# Patient Record
Sex: FEMALE | Race: ASIAN | NOT HISPANIC OR LATINO
[De-identification: names, ages, dates, MRNs, and addresses within clinical notes are randomized per-mention and may not be internally consistent; named-entity substitution may affect disease eponyms.]

---

## 2023-03-13 ENCOUNTER — NON-APPOINTMENT (OUTPATIENT)
Age: 53
End: 2023-03-13

## 2023-03-16 ENCOUNTER — APPOINTMENT (OUTPATIENT)
Dept: NEUROLOGY | Facility: CLINIC | Age: 53
End: 2023-03-16
Payer: COMMERCIAL

## 2023-03-16 ENCOUNTER — TRANSCRIPTION ENCOUNTER (OUTPATIENT)
Age: 53
End: 2023-03-16

## 2023-03-16 DIAGNOSIS — C79.49 SECONDARY MALIGNANT NEOPLASM OF OTHER PARTS OF NERVOUS SYSTEM: ICD-10-CM

## 2023-03-16 DIAGNOSIS — C79.31 MALIGNANT NEOPLASM OF UNSPECIFIED PART OF UNSPECIFIED BRONCHUS OR LUNG: ICD-10-CM

## 2023-03-16 DIAGNOSIS — C34.90 MALIGNANT NEOPLASM OF UNSPECIFIED PART OF UNSPECIFIED BRONCHUS OR LUNG: ICD-10-CM

## 2023-03-16 DIAGNOSIS — C80.1 SECONDARY MALIGNANT NEOPLASM OF OTHER PARTS OF NERVOUS SYSTEM: ICD-10-CM

## 2023-03-16 PROBLEM — Z00.00 ENCOUNTER FOR PREVENTIVE HEALTH EXAMINATION: Status: ACTIVE | Noted: 2023-03-16

## 2023-03-16 PROCEDURE — 99205 OFFICE O/P NEW HI 60 MIN: CPT | Mod: 95

## 2023-03-16 NOTE — HISTORY OF PRESENT ILLNESS
[FreeTextEntry1] : This 52 year old right handed woman is self-referred for my advice and opinion regarding leptomeningeal mets\par \par She presented in 2021 with headaches.  She was found to have bony mets and lepto on MRI with enhancement of multiple cranial nerves.  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  She had a shunt placed ([id number]) with ommaya in tandem for symptomatic hydrocephalus by Dr. Kermit Olivares at [facility], followed by WBRT to 30 Gy by Dr. Clark, also at [facility].  She initiated osimertinib, but was admitted with a symptomatic large right sided SDH in 2/22, resolved with shunt closure (SDH occurred at 150).  \par \par She moved with her  to [place] in 6/22 and has been followed by Madan Moffett and Dionne at [facility].  In 9/22, CSF revealed 10 wbc with positive cytology, and she increased osimertinib to 160, with improvement of CSF WBC to 2 in 11/22. Recent PET with progressive bony lesions in L spine and nodes in chest (subcm).  Osimertinib was reduced for poor tolerance to 80/160 alternating.days.   In 2/23 CSF revealed 12 wbc.  Notably there was amplification of cMET and HER2 in subsets of the cells on Bio[employer] panel.  Guardant in blood revealed no mutations.  \par \par Clinically she is without any neurologic symptoms. Last MRI C spine and Brain in 12/22.  Plan is for SBRT to spine lesion.  However it was discussed with her that she could increase ormertinib to 160, or consider IT trastuzumab or IT pemetrexed.\par \par PMH:  none\par \par PSH:  VPS as above. \par \par SHX:   Brennan present.\par \par FHX: Older brother had a brain tumor

## 2023-03-16 NOTE — DISCUSSION/SUMMARY
[FreeTextEntry1] : EGFR mutant NSCLC, with lepto.  Seemingly has progression in CSF and systemically.  NGS on CSF suggests resistance mutation. \par \par I discussed my recommendations for MRI brain and spine to be updated prior to decision regarding IT therapy.  I do think that biopsy of spine lesion if amenable is crucial to determining if progressive disease systemically is being driven by the same mutations.  I recommended against IT trastuzumab given that it is not at all clear that HER2 is the primary resistance mutation here.  I do think that IT MTX or pemetrexed if available is reasonable and I favor it over increased dose osimertinib alone.  Adding a MET inhibitor to osimertinib might be an option as well. \par \par \par They will discuss with their team in [place].

## 2023-03-16 NOTE — PHYSICAL EXAM
[FreeTextEntry1] : Limited by Telehealth\par \par GEN:  well appearing woman, no NAD\par \par MSE:  alert, oriented x3, able to provide history without aphasia or memory defects.  Attention normal\par \par CN:  EOMI, face symmetric, tongue without deviation\par \par MOTOR:  no drift, moves all extremities at least antigravity.  no tremor. \par \par GAIT:  walks on own with normal station and gait.

## 2023-03-16 NOTE — REASON FOR VISIT
[Home] : at home, [unfilled] , at the time of the visit. [Medical Office: ([facility])___] : at the medical office located in  [Patient] : the patient [Self] : self